# Patient Record
Sex: MALE | Race: BLACK OR AFRICAN AMERICAN | Employment: OTHER | URBAN - METROPOLITAN AREA
[De-identification: names, ages, dates, MRNs, and addresses within clinical notes are randomized per-mention and may not be internally consistent; named-entity substitution may affect disease eponyms.]

---

## 2020-06-29 ENCOUNTER — OFFICE VISIT (OUTPATIENT)
Dept: PRIMARY CARE CLINIC | Age: 75
End: 2020-06-29

## 2020-06-29 NOTE — PROGRESS NOTES
Justo Rodriguez received a viral test for COVID-19. They were educated on isolation and quarantine as appropriate. For any symptoms, they were directed to seek care from their PCP, given contact information to establish with a doctor, directed to an urgent care or the emergency room.

## 2020-06-30 LAB
SARS-COV-2: NOT DETECTED
SOURCE: NORMAL

## 2020-12-17 ENCOUNTER — OFFICE VISIT (OUTPATIENT)
Dept: SLEEP MEDICINE | Age: 75
End: 2020-12-17
Payer: MEDICARE

## 2020-12-17 VITALS
WEIGHT: 175 LBS | SYSTOLIC BLOOD PRESSURE: 134 MMHG | TEMPERATURE: 97.9 F | HEART RATE: 100 BPM | DIASTOLIC BLOOD PRESSURE: 78 MMHG

## 2020-12-17 PROCEDURE — 99204 OFFICE O/P NEW MOD 45 MIN: CPT | Performed by: PSYCHIATRY & NEUROLOGY

## 2020-12-17 RX ORDER — DOXEPIN HYDROCHLORIDE 10 MG/1
10 CAPSULE ORAL NIGHTLY
Qty: 30 CAPSULE | Refills: 3 | Status: SHIPPED | OUTPATIENT
Start: 2020-12-17 | End: 2021-04-14

## 2020-12-17 RX ORDER — TRAZODONE HYDROCHLORIDE 50 MG/1
TABLET ORAL
COMMUNITY
Start: 2020-12-15 | End: 2020-12-17 | Stop reason: CLARIF

## 2020-12-17 RX ORDER — PANTOPRAZOLE SODIUM 40 MG/1
40 TABLET, DELAYED RELEASE ORAL
COMMUNITY
Start: 2020-09-01

## 2020-12-17 RX ORDER — ATORVASTATIN CALCIUM 10 MG/1
TABLET, FILM COATED ORAL
COMMUNITY
Start: 2020-10-05

## 2020-12-17 RX ORDER — NAPROXEN 375 MG/1
TABLET ORAL
COMMUNITY
Start: 2020-11-13

## 2020-12-17 SDOH — HEALTH STABILITY: MENTAL HEALTH: HOW MANY STANDARD DRINKS CONTAINING ALCOHOL DO YOU HAVE ON A TYPICAL DAY?: NOT ASKED

## 2020-12-17 SDOH — HEALTH STABILITY: MENTAL HEALTH: HOW OFTEN DO YOU HAVE A DRINK CONTAINING ALCOHOL?: NOT ASKED

## 2020-12-17 ASSESSMENT — SLEEP AND FATIGUE QUESTIONNAIRES
HOW LIKELY ARE YOU TO NOD OFF OR FALL ASLEEP WHILE LYING DOWN TO REST IN THE AFTERNOON WHEN CIRCUMSTANCES PERMIT: 0
HOW LIKELY ARE YOU TO NOD OFF OR FALL ASLEEP WHILE SITTING AND TALKING TO SOMEONE: 1
HOW LIKELY ARE YOU TO NOD OFF OR FALL ASLEEP WHILE SITTING INACTIVE IN A PUBLIC PLACE: 0
ESS TOTAL SCORE: 1
HOW LIKELY ARE YOU TO NOD OFF OR FALL ASLEEP WHILE WATCHING TV: 0
HOW LIKELY ARE YOU TO NOD OFF OR FALL ASLEEP WHILE SITTING QUIETLY AFTER LUNCH WITHOUT ALCOHOL: 0
HOW LIKELY ARE YOU TO NOD OFF OR FALL ASLEEP IN A CAR, WHILE STOPPED FOR A FEW MINUTES IN TRAFFIC: 0
HOW LIKELY ARE YOU TO NOD OFF OR FALL ASLEEP WHEN YOU ARE A PASSENGER IN A CAR FOR AN HOUR WITHOUT A BREAK: 0
HOW LIKELY ARE YOU TO NOD OFF OR FALL ASLEEP WHILE SITTING AND READING: 0
NECK CIRCUMFERENCE (INCHES): 15

## 2020-12-17 ASSESSMENT — ENCOUNTER SYMPTOMS
COUGH: 0
GASTROINTESTINAL NEGATIVE: 1
EYES NEGATIVE: 1
ALLERGIC/IMMUNOLOGIC NEGATIVE: 1
APNEA: 0

## 2020-12-17 NOTE — PROGRESS NOTES
MD CHELY Rodriguez Board Certified in Sleep Medicine  Certified [facility] Sleep Medicine  Board Certified in Neurology [address]  [address] [facility] [address],  Hermes Carballo [address]  326 [facility] ([address] [facility]  [address], [address]           [address]  382 [facility] [address] [phone]    Subjective:     Patient ID: Jada Godoy is a 67 y.o. male. Chief Complaint   Patient presents with    New Patient     Np, hx of snoring       HPI:        Jada Godoy is a 67 y.o. male referred by No ref. provider found for a sleep evaluation. He complains of snoring, tossing and turning, difficulty falling asleep once awakened, insomnia but he denies snorting, choking, periods of not breathing, knees buckling with laughing, completely or partially paralyzed while falling asleep or waking up, noisy environment, uncomfortable room temperature, uncomfortable bedding. Symptoms began a few years ago, unchanged since that time. But the insomnia for 2-3 weeks only. Was diagnosed with H pylori 2-3 weeks ago. SLEEP SCHEDULE: Goes to bed around 12-1 AM in the weekdays and 12-1 PM in the weekends. It usually takes the patient 120-180 minutes to fall asleep. The patient gets up 2 per night to go to the bathroom. The Patient finally gets up at 9 AM during the weekdays and 9 AM in the weekends. patient wakes up with dry mouth. The patient has restless sleep with frequent arousals in addition to the Patient has significant daytime sleepiness. The Patient scored Total score: 1 on [place] Sleepiness Scale ( more than 10 is indicative of daytime sleepiness)and 26 in fatigue scale ( more than 36 is indicative of daytime fatigue). The patient takes few naps/week for 45 minutes and usually is not refreshing nap.      Previous evaluation and treatment has included- none.    Insomnia with sleep initiation and maintaining, usually takes the patient  hours to fall in sleep, wakes up 1-3 times from few to 60 minutes eachtime to fall back in sleep, usually stays in bed trying to fall in sleep, this might happened 7 nights a week, started 2 weeks ago, melatonin 3 mg did not work, sleep aid slept 2 hours, started naps during the day 45 minutes. Always sleeps only 5 hours ( short sleeper). Between 3-5 PM becomes sleepy. DOT/CDL - N/A  MIKEL/'[place]urah - N/A      Previous Report(s) Reviewed: historical medical records       Social History     Socioeconomic History    Marital status:      Spouse name: Not on file    Number of children: Not on file    Years of education: Not on file    Highest education level: Not on file   Occupational History    Not on file   Social Needs    Financial resource strain: Not on file    Food insecurity     Worry: Not on file     Inability: Not on file    Transportation needs     Medical: Not on file     Non-medical: Not on file   Tobacco Use    Smoking status: Former Smoker     Quit date:      Years since quittin.9    Smokeless tobacco: Never Used   Substance and Sexual Activity    Alcohol use:  Yes     Alcohol/week: 2.0 standard drinks     Types: 1 Glasses of wine, 1 Cans of beer per week    Drug use: Never    Sexual activity: Not on file   Lifestyle    Physical activity     Days per week: Not on file     Minutes per session: Not on file    Stress: Not on file   Relationships    Social connections     Talks on phone: Not on file     Gets together: Not on file     Attends Sabianism service: Not on file     Active member of club or organization: Not on file     Attends meetings of clubs or organizations: Not on file     Relationship status: Not on file    Intimate partner violence     Fear of current or ex partner: Not on file     Emotionally abused: Not on file     Physically abused: Not on file     Forced sexual activity: Not on file   Other Topics Concern    Not on file   Social History Narrative    Not on file       Prior to Admission medications    Medication Sig Start Date End Date Taking? Authorizing Provider   atorvastatin (LIPITOR) 10 MG tablet TAKE ONE TABLET BY MOUTH DAILY 10/5/20  Yes Historical Provider, MD   naproxen (NAPROSYN) 375 MG tablet TAKE ONE TABLET BY MOUTH TWICE A DAY AS NEEDED FOR GOUT FLARE 11/13/20  Yes Historical Provider, MD   pantoprazole (PROTONIX) 40 MG tablet Take 40 mg by mouth every morning (before breakfast) 9/1/20  Yes Historical Provider, MD   doxepin (SINEQUAN) 10 MG capsule Take 1 capsule by mouth nightly 12/17/20  Yes Rajni Valentino MD       Allergies as of 12/17/2020    (No Known Allergies)       Patient Active Problem List   Diagnosis    Gout    Irritable bowel syndrome       No past medical history on file. No past surgical history on file. No family history on file. Review of Systems   Constitutional: Positive for diaphoresis and fatigue. HENT: Positive for congestion. Eyes: Negative. Respiratory: Negative for apnea and cough. Cardiovascular: Negative. Negative for leg swelling. Gastrointestinal: Negative. Endocrine: Positive for cold intolerance and heat intolerance. Genitourinary: Positive for frequency. Musculoskeletal: Positive for arthralgias and myalgias. Skin: Negative. Allergic/Immunologic: Negative. Neurological: Negative for headaches. Hematological: Negative. Psychiatric/Behavioral: The patient is nervous/anxious. Objective:     Vitals:  Weight BMI Neck circumference    Wt Readings from Last 3 Encounters:   12/17/20 175 lb (79.4 kg)    There is no height or weight on file to calculate BMI.  Neck circumference: 15     BP HR SaO2   BP Readings from Last 3 Encounters:   12/17/20 134/78    Pulse Readings from Last 3 Encounters:   12/17/20 100    SpO2 Readings from Last 3 Encounters:   No data found for SpO2        The mandibular molar Class :   [x]1 []2 []3      Mallampati I Airway Classification:   []1 []2 [x]3 []4        Physical Exam  Vitals signs and nursing note reviewed. Constitutional:       Appearance: Normal appearance. HENT:      Head: Atraumatic. Nose: Nose normal.      Mouth/Throat:      Comments: Mallampati class 3, no retrognathia or hypognathia , normal airflow in bilateral nostrils, no septum deviation , crowded oropharynx with low soft palate, high arched hard palate,no tonsils enlargement. Eyes:      Extraocular Movements: Extraocular movements intact. Neck:      Musculoskeletal: Normal range of motion and neck supple. Cardiovascular:      Rate and Rhythm: Normal rate and regular rhythm. Heart sounds: Normal heart sounds. Pulmonary:      Effort: Pulmonary effort is normal.      Breath sounds: Normal breath sounds. Musculoskeletal: Normal range of motion. Skin:     General: Skin is warm. Neurological:      General: No focal deficit present. Psychiatric:         Mood and Affect: Mood normal.         Assessment:   Acute insomnia for 2 weeks  Short sleeper  Suspected GUZMAN with snoring and insomnia. Diagnosis Orders   1. Obstructive sleep apnea  Home Sleep Study   2. Acute insomnia  Home Sleep Study    doxepin (SINEQUAN) 10 MG capsule     Plan:     Sleep compression between 12 AM till 6:30 AM  No naps  Take the medicine at 11 PM.  Patient was counseled about the pathophysiology of obstructive sleep apnea syndrome and the methods for evaluating its presence and severity. Patient was counseled to avoid driving and other potentially hazardous circumstances if the patient is experiencing excessive sleepiness.   Treatment considerations include the use of nasal CPAP, oral dental appliance or a surgical intervention, which should be based on otolarygologic findings, In the meantime, the patient should be cautioned to avoid the use of alcohol or other depressant medications because of potential for increasing the duration and severity of apnea and cautioned regarding driving or operating and dangerous equipment if the patient is experiencing daytime sleepiness. .        Orders Placed This Encounter   Procedures    Home Sleep Study       Return in about 4 weeks (around 1/14/2021) for insomnia.     Los Spicer MD  Medical Director - [facility]

## 2020-12-17 NOTE — PATIENT INSTRUCTIONS
Sleep compression between 12 AM till 6:30 AM  No naps  Take the medicine at 11 PM.  Patient Education        Insomnia: Care Instructions  Your Care Instructions     Insomnia is the inability to sleep well. It is a common problem for most people at some time. Insomnia may make it hard for you to get to sleep, stay asleep, or sleep as long as you need to. This can make you tired and grouchy during the day. It can also make you forgetful, less effective at work, and unhappy. Insomnia can be caused by conditions such as depression or anxiety. Pain can also affect your ability to sleep. When these problems are solved, the insomnia usually clears up. But sometimes bad sleep habits can cause insomnia. If insomnia is affecting your work or your enjoyment of life, you can take steps to improve your sleep. Follow-up care is a key part of your treatment and safety. Be sure to make and go to all appointments, and call your doctor if you are having problems. It's also a good idea to know your test results and keep a list of the medicines you take. How can you care for yourself at home? What to avoid   · Do not have drinks with caffeine, such as coffee or black tea, for 8 hours before bed. · Do not smoke or use other types of tobacco near bedtime. Nicotine is a stimulant and can keep you awake. · Avoid drinking alcohol late in the evening, because it can cause you to wake in the middle of the night. · Do not eat a big meal close to bedtime. If you are hungry, eat a light snack. · Do not drink a lot of water close to bedtime, because the need to urinate may wake you up during the night. · Do not read or watch TV in bed. Use the bed only for sleeping and sexual activity. What to try   · Go to bed at the same time every night, and wake up at the same time every morning. Do not take naps during the day. · Keep your bedroom quiet, dark, and cool. · Sleep on a comfortable pillow and mattress.   · If watching the clock makes you anxious, turn it facing away from you so you cannot see the time. · If you worry when you lie down, start a worry book. Well before bedtime, write down your worries, and then set the book and your concerns aside. · Try meditation or other relaxation techniques before you go to bed. · If you cannot fall asleep, get up and go to another room until you feel sleepy. Do something relaxing. Repeat your bedtime routine before you go to bed again. · Make your house quiet and calm about an hour before bedtime. Turn down the lights, turn off the TV, log off the computer, and turn down the volume on music. This can help you relax after a busy day. When should you call for help? Watch closely for changes in your health, and be sure to contact your doctor if:    · Your efforts to improve your sleep do not work.     · Your insomnia gets worse.     · You have been feeling down, depressed, or hopeless or have lost interest in things that you usually enjoy. Where can you learn more? Go to https://[employer] Art.SenseData. org and sign in to your [employer] account. Enter P513 in the OnAir3G box to learn more about \"Insomnia: Care Instructions. \"     If you do not have an account, please click on the \"Sign Up Now\" link. Current as of: December 16, 2019               Content Version: 12.6  © 4766-3205 [employer]. Care instructions adapted under license by [facility] ([facility]). If you have questions about a medical condition or this instruction, always ask your healthcare professional. [place] [address] any warranty or liability for your use of this information.

## 2020-12-21 RX ORDER — DOCUSATE SODIUM 100 MG/1
CAPSULE, LIQUID FILLED ORAL
Qty: 90 CAPSULE | Refills: 3 | Status: SHIPPED | OUTPATIENT
Start: 2020-12-21

## 2020-12-21 NOTE — TELEPHONE ENCOUNTER
Patient states he is experiencing constipation from the Doxepin. He refuses to try a fleet enema. He wants to know what he can take for his constipation? He states he will have to stop taking the medication if this is a common side effect. Can he take anything else? Please advise.

## 2020-12-21 NOTE — TELEPHONE ENCOUNTER
Tennille Valentino called back to inquire as to whether he should continue the doxepin.   I spoke with Dr Jerrica Hagan in the office and he said for Tennille Valentino to take both the doxepin and the colace

## 2020-12-30 ENCOUNTER — HOSPITAL ENCOUNTER (OUTPATIENT)
Dept: SLEEP CENTER | Age: 75
Discharge: HOME | End: 2020-12-30
Payer: MEDICARE

## 2020-12-30 PROCEDURE — 95806 SLEEP STUDY UNATT&RESP EFFT: CPT

## 2021-01-04 PROCEDURE — 95806 SLEEP STUDY UNATT&RESP EFFT: CPT | Performed by: PSYCHIATRY & NEUROLOGY

## 2021-01-04 NOTE — PROGRESS NOTES
Jv Drilling         : 1945  [] [address]     [] [address]      [] Camille     [][employer]    [] [employer]  [] [facility]   [] Other:  Diagnosis: [x] GUZMAN (G47.33) [] CSA (G47.31) [] Apnea (G47.30)   Length of Need: [] 12 Months [x] 99 Months [] Other:    Machine (GUSTAVO!): [x] Respironics Dream Station      Auto [x] ResMed AirSense     Auto [] Other:     [x]  CPAP () [] Bilevel ()   Mode: [x] Auto [] Spontaneous    Mode: [] Auto [] Spontaneous           Between 5 and 15 cm                 Comfort Settings:   - Ramp Pressure: 5 cmH2O                                        - Ramp time: 15 min                                     -  Flex/EPR - 3 full time                                    - For ResMed Bilevel (TiMax-4 sec   TiMin- 0.2 sec)     Humidifier: [x] Heated ()        [x] Water chamber replacement ()/ 1 per 6 months        Mask:   [x] Nasal () /1 per 3 months [x] Full Face () /1 per 3 months   [x] Patient choice -Size and fit mask [x] Patient Choice - Size and fit mask   [] Dispense:  [] Dispense:    [x] Headgear () / 1 per 3 months [x] Headgear () / 1 per 3 months   [x] Replacement Nasal Cushion ()/2 per month [x] Interface Replacement ()/1 per month   [x] Replacement Nasal Pillows ()/2 per month         Tubing: [x] Heated ()/1 per 3 months    [] Standard ()/1 per 3 months [] Other:           Filters: [x] Non-disposable ()/1 per 6 months     [x] Ultra-Fine, Disposable ()/2 per month        Miscellaneous: [x] Chin Strap ()/ 1 per 6 months [] O2 bleed-in:       LPM   [] Oximetry on CPAP/Bilevel []  Other:          Start Order Date: 21    MEDICAL JUSTIFICATION:  I, the undersigned, certify that the above prescribed supplies are medically necessary for this patients wellbeing.   In my opinion, the supplies are both reasonable and necessary in reference to accepted standards of medicalpractice in treatment of this patients condition.     Katiuska Arguello MD      NPI: [id number]       Order Signed Date: 01/04/21    Electronically signed by Katiuska Arguello MD on 1/4/2021 at 10:54 AM home

## 2021-01-05 ENCOUNTER — TELEPHONE (OUTPATIENT)
Dept: PULMONOLOGY | Age: 76
End: 2021-01-05

## 2021-01-19 ENCOUNTER — OFFICE VISIT (OUTPATIENT)
Dept: SLEEP MEDICINE | Age: 76
End: 2021-01-19
Payer: MEDICARE

## 2021-01-19 VITALS
TEMPERATURE: 96.9 F | HEART RATE: 89 BPM | HEIGHT: 69 IN | RESPIRATION RATE: 16 BRPM | SYSTOLIC BLOOD PRESSURE: 131 MMHG | BODY MASS INDEX: 26.42 KG/M2 | OXYGEN SATURATION: 99 % | WEIGHT: 178.4 LBS | DIASTOLIC BLOOD PRESSURE: 76 MMHG

## 2021-01-19 DIAGNOSIS — G47.33 OBSTRUCTIVE SLEEP APNEA: ICD-10-CM

## 2021-01-19 DIAGNOSIS — G47.00 ACUTE INSOMNIA: Primary | ICD-10-CM

## 2021-01-19 PROCEDURE — 99213 OFFICE O/P EST LOW 20 MIN: CPT | Performed by: PSYCHIATRY & NEUROLOGY

## 2021-01-19 ASSESSMENT — SLEEP AND FATIGUE QUESTIONNAIRES
HOW LIKELY ARE YOU TO NOD OFF OR FALL ASLEEP WHILE SITTING AND READING: 1
HOW LIKELY ARE YOU TO NOD OFF OR FALL ASLEEP WHILE LYING DOWN TO REST IN THE AFTERNOON WHEN CIRCUMSTANCES PERMIT: 1
HOW LIKELY ARE YOU TO NOD OFF OR FALL ASLEEP WHEN YOU ARE A PASSENGER IN A CAR FOR AN HOUR WITHOUT A BREAK: 0
HOW LIKELY ARE YOU TO NOD OFF OR FALL ASLEEP IN A CAR, WHILE STOPPED FOR A FEW MINUTES IN TRAFFIC: 0
HOW LIKELY ARE YOU TO NOD OFF OR FALL ASLEEP WHILE SITTING AND TALKING TO SOMEONE: 0
ESS TOTAL SCORE: 2

## 2021-01-19 ASSESSMENT — ENCOUNTER SYMPTOMS: APNEA: 0

## 2021-01-19 NOTE — PROGRESS NOTES
Elora Eisenmenger, MD ABIM Board Certified in Sleep Medicine  Certified in [address] [place] [address] Certified in Neurology [address]  [address], [address]  [phone]   [address] [place] [address], [address] [place] [address] Ne                      [address]  [address] [place] [address] [phone]    Subjective:     Patient ID: Alvaro Calloway is a 67 y.o. male. Chief Complaint   Patient presents with    Follow-up     GUZMAN       HPI:        Alvaro Calloway is a 67 y.o. male was seen today as a follow for obstructive sleep apnea. The patient underwent home sleep testing on 2020, the overnight registration revealed mild obstructive sleep apnea with apnea hypopnea index of 11.6/hr with lowest O2 saturation of 83%, patient spent about 17.5 minutes below 90%. Was started on Doxepin 10 mg last visit. He does not want to try the CPAP.      DOT/CDL - N/A        Previous Report(s)Reviewed: historical medical records         Social History     Socioeconomic History    Marital status:      Spouse name: Not on file    Number of children: Not on file    Years of education: Not on file    Highest education level: Not on file   Occupational History    Not on file   Social Needs    Financial resource strain: Not on file    Food insecurity     Worry: Not on file     Inability: Not on file    Transportation needs     Medical: Not on file     Non-medical: Not on file   Tobacco Use    Smoking status: Former Smoker     Quit date:      Years since quittin.0    Smokeless tobacco: Never Used   Substance and Sexual Activity    Alcohol use:  Yes     Alcohol/week: 2.0 standard drinks     Types: 1 Glasses of wine, 1 Cans of beer per week    Drug use: Never    Sexual activity: Not on file   Lifestyle    Physical activity Days per week: Not on file     Minutes per session: Not on file    Stress: Not on file   Relationships    Social connections     Talks on phone: Not on file     Gets together: Not on file     Attends Denominational service: Not on file     Active member of club or organization: Not on file     Attends meetings of clubs or organizations: Not on file     Relationship status: Not on file    Intimate partner violence     Fear of current or ex partner: Not on file     Emotionally abused: Not on file     Physically abused: Not on file     Forced sexual activity: Not on file   Other Topics Concern    Not on file   Social History Narrative    Not on file       Prior to Admission medications    Medication Sig Start Date End Date Taking? Authorizing Provider   docusate sodium (COLACE) 100 MG capsule One cap QD 12/21/20  Yes Milagros Iniguez MD   atorvastatin (LIPITOR) 10 MG tablet TAKE ONE TABLET BY MOUTH DAILY 10/5/20  Yes Historical Provider, MD   naproxen (NAPROSYN) 375 MG tablet TAKE ONE TABLET BY MOUTH TWICE A DAY AS NEEDED FOR GOUT FLARE 11/13/20  Yes Historical Provider, MD   pantoprazole (PROTONIX) 40 MG tablet Take 40 mg by mouth every morning (before breakfast) 9/1/20  Yes Historical Provider, MD   doxepin (SINEQUAN) 10 MG capsule Take 1 capsule by mouth nightly 12/17/20  Yes Milagros Iniguez MD       Allergies as of 01/19/2021    (No Known Allergies)       Patient Active Problem List   Diagnosis    Gout    Irritable bowel syndrome    Obstructive sleep apnea       No past medical history on file. No past surgical history on file. No family history on file. Review of Systems   Constitutional: Negative for fatigue. Respiratory: Negative for apnea. Genitourinary: Negative for frequency. Neurological: Negative for headaches.        Objective:     Vitals:  Weight BMI Neck circumference    Wt Readings from Last 3 Encounters:   01/19/21 178 lb 6.4 oz (80.9 kg)   12/17/20 175 lb (79.4 kg)

## 2021-01-19 NOTE — PATIENT INSTRUCTIONS

## 2021-04-13 DIAGNOSIS — G47.00 ACUTE INSOMNIA: ICD-10-CM

## 2021-04-14 RX ORDER — DOXEPIN HYDROCHLORIDE 10 MG/1
10 CAPSULE ORAL NIGHTLY
Qty: 30 CAPSULE | Refills: 2 | Status: SHIPPED | OUTPATIENT
Start: 2021-04-14 | End: 2021-07-12

## 2021-07-10 DIAGNOSIS — G47.00 ACUTE INSOMNIA: ICD-10-CM

## 2021-07-12 RX ORDER — DOXEPIN HYDROCHLORIDE 10 MG/1
10 CAPSULE ORAL NIGHTLY
Qty: 30 CAPSULE | Refills: 1 | Status: SHIPPED | OUTPATIENT
Start: 2021-07-12 | End: 2021-07-19 | Stop reason: SDUPTHER

## 2021-07-19 ENCOUNTER — OFFICE VISIT (OUTPATIENT)
Dept: SLEEP MEDICINE | Age: 76
End: 2021-07-19
Payer: MEDICARE

## 2021-07-19 VITALS
OXYGEN SATURATION: 97 % | TEMPERATURE: 97.8 F | HEIGHT: 69 IN | WEIGHT: 179.2 LBS | RESPIRATION RATE: 16 BRPM | HEART RATE: 76 BPM | SYSTOLIC BLOOD PRESSURE: 120 MMHG | BODY MASS INDEX: 26.54 KG/M2 | DIASTOLIC BLOOD PRESSURE: 70 MMHG

## 2021-07-19 DIAGNOSIS — F51.04 CHRONIC INSOMNIA: ICD-10-CM

## 2021-07-19 DIAGNOSIS — G47.33 MILD OBSTRUCTIVE SLEEP APNEA: Primary | ICD-10-CM

## 2021-07-19 PROCEDURE — 99214 OFFICE O/P EST MOD 30 MIN: CPT | Performed by: PSYCHIATRY & NEUROLOGY

## 2021-07-19 RX ORDER — DOXEPIN HYDROCHLORIDE 10 MG/1
10 CAPSULE ORAL NIGHTLY
Qty: 30 CAPSULE | Refills: 1 | Status: SHIPPED | OUTPATIENT
Start: 2021-07-19 | End: 2021-11-18

## 2021-07-19 ASSESSMENT — SLEEP AND FATIGUE QUESTIONNAIRES
HOW LIKELY ARE YOU TO NOD OFF OR FALL ASLEEP WHILE SITTING INACTIVE IN A PUBLIC PLACE: 0
ESS TOTAL SCORE: 2
HOW LIKELY ARE YOU TO NOD OFF OR FALL ASLEEP WHILE SITTING QUIETLY AFTER LUNCH WITHOUT ALCOHOL: 0
HOW LIKELY ARE YOU TO NOD OFF OR FALL ASLEEP IN A CAR, WHILE STOPPED FOR A FEW MINUTES IN TRAFFIC: 0
HOW LIKELY ARE YOU TO NOD OFF OR FALL ASLEEP WHILE SITTING AND READING: 0
HOW LIKELY ARE YOU TO NOD OFF OR FALL ASLEEP WHILE LYING DOWN TO REST IN THE AFTERNOON WHEN CIRCUMSTANCES PERMIT: 1
HOW LIKELY ARE YOU TO NOD OFF OR FALL ASLEEP WHEN YOU ARE A PASSENGER IN A CAR FOR AN HOUR WITHOUT A BREAK: 0
HOW LIKELY ARE YOU TO NOD OFF OR FALL ASLEEP WHILE SITTING AND TALKING TO SOMEONE: 0
HOW LIKELY ARE YOU TO NOD OFF OR FALL ASLEEP WHILE WATCHING TV: 1

## 2021-07-19 NOTE — PATIENT INSTRUCTIONS
worry when you lie down, start a worry book. Well before bedtime, write down your worries, and then set the book and your concerns aside. · Try meditation or other relaxation techniques before you go to bed. · If you cannot fall asleep, get up and go to another room until you feel sleepy. Do something relaxing. Repeat your bedtime routine before you go to bed again. · Make your house quiet and calm about an hour before bedtime. Turn down the lights, turn off the TV, log off the computer, and turn down the volume on music. This can help you relax after a busy day. When should you call for help? Watch closely for changes in your health, and be sure to contact your doctor if:    · Your efforts to improve your sleep do not work.     · Your insomnia gets worse.     · You have been feeling down, depressed, or hopeless or have lost interest in things that you usually enjoy. Where can you learn more? Go to https://[employer] Nanotechnology[employer]Hemp [employer].Personify Inc. org and sign in to your [employer] account. Enter P513 in the Digital Signal box to learn more about \"Insomnia: Care Instructions. \"     If you do not have an account, please click on the \"Sign Up Now\" link. Current as of: August 31, 2020               Content Version: 12.9  © 2006-2021 Healthwise, Incorporated. Care instructions adapted under license by [facility] ([facility]). If you have questions about a medical condition or this instruction, always ask your healthcare professional. [place] [address] any warranty or liability for your use of this information.

## 2021-07-19 NOTE — PROGRESS NOTES
MD CHELY Galan Board Certified in Sleep Medicine  Certified in [address] [place] [address] Certified in Neurology [address]  1000 [facility] [address] [address],  [address]  [phone] 794 [facility], [address] [place] [address] Ave [address]                      [address]  [address] [place] [address] [phone]    Subjective:     Patient ID: Giovanna Bateman is a 67 y.o. male. Chief Complaint   Patient presents with    Follow-up     7 month f/u       HPI:        Giovanna Bateman is a 67 y.o. male was seen today as a follow for mild obstructive sleep apnea with apnea hypopnea index of 11.6/hr with lowest O2 saturation of 83%, patient spent about 17.5 minutes below 90%(not on CPAP) and insomnia on Doxepin 10 mg. [place] gained weight in the last 6 months. Still takes Doxepin 10 mg , his brain does not shut down at night, takes it at 11 PM and fall in sleep at 12 Midnight. DOT/CDL - N/A        Previous Report(s)Reviewed: historical medical records         Social History     Socioeconomic History    Marital status:      Spouse name: Not on file    Number of children: Not on file    Years of education: Not on file    Highest education level: Not on file   Occupational History    Not on file   Tobacco Use    Smoking status: Former Smoker     Quit date:      Years since quittin.5    Smokeless tobacco: Never Used   Vaping Use    Vaping Use: Never used   Substance and Sexual Activity    Alcohol use:  Yes     Alcohol/week: 2.0 standard drinks     Types: 1 Glasses of wine, 1 Cans of beer per week    Drug use: Never    Sexual activity: Not on file   Other Topics Concern    Not on file   Social History Narrative    Not on file     Social Determinants of Health     Financial Resource Strain:     Difficulty of Paying Living Expenses:    Food Insecurity:     Worried About Running Out of Food in the Last Year:     [address] in the Last Year:    Transportation Needs:     Lack of Transportation (Medical):  Lack of Transportation (Non-Medical):    Physical Activity:     Days of Exercise per Week:     Minutes of Exercise per Session:    Stress:     Feeling of Stress :    Social Connections:     Frequency of Communication with Friends and Family:     Frequency of Social Gatherings with Friends and Family:     Attends Christian Services:     Active Member of Clubs or Organizations:     Attends Club or Organization Meetings:     Marital Status:    Intimate Partner Violence:     Fear of Current or Ex-Partner:     Emotionally Abused:     Physically Abused:     Sexually Abused:        Prior to Admission medications    Medication Sig Start Date End Date Taking? Authorizing Provider   doxepin (SINEQUAN) 10 MG capsule Take 1 capsule by mouth nightly 7/19/21  Yes Sung Tilley MD   docusate sodium (COLACE) 100 MG capsule One cap QD 12/21/20  Yes Sung Tilley MD   atorvastatin (LIPITOR) 10 MG tablet TAKE ONE TABLET BY MOUTH DAILY 10/5/20  Yes Historical Provider, MD   naproxen (NAPROSYN) 375 MG tablet TAKE ONE TABLET BY MOUTH TWICE A DAY AS NEEDED FOR GOUT FLARE 11/13/20  Yes Historical Provider, MD   pantoprazole (PROTONIX) 40 MG tablet Take 40 mg by mouth every morning (before breakfast) 9/1/20  Yes Historical Provider, MD       Allergies as of 07/19/2021    (No Known Allergies)       Patient Active Problem List   Diagnosis    Gout    Irritable bowel syndrome    Obstructive sleep apnea       History reviewed. No pertinent past medical history. History reviewed. No pertinent surgical history. History reviewed. No pertinent family history. Review of Systems   Psychiatric/Behavioral: Positive for sleep disturbance.        Objective:     Vitals:  Weight BMI Neck circumference    Wt Readings from Last 3 Encounters:   07/19/21 179

## 2021-11-18 DIAGNOSIS — F51.04 CHRONIC INSOMNIA: ICD-10-CM

## 2021-11-18 RX ORDER — DOXEPIN HYDROCHLORIDE 10 MG/1
10 CAPSULE ORAL NIGHTLY
Qty: 30 CAPSULE | Refills: 1 | Status: SHIPPED | OUTPATIENT
Start: 2021-11-18

## 2024-01-10 ENCOUNTER — OFFICE VISIT (OUTPATIENT)
Dept: ORTHOPEDIC SURGERY | Age: 79
End: 2024-01-10

## 2024-01-10 VITALS — HEIGHT: 69 IN | WEIGHT: 188 LBS | BODY MASS INDEX: 27.85 KG/M2

## 2024-01-10 DIAGNOSIS — M25.561 CHRONIC PAIN OF RIGHT KNEE: Primary | ICD-10-CM

## 2024-01-10 DIAGNOSIS — M17.11 ARTHRITIS OF RIGHT KNEE: ICD-10-CM

## 2024-01-10 DIAGNOSIS — G89.29 CHRONIC PAIN OF RIGHT KNEE: Primary | ICD-10-CM

## 2024-01-10 RX ORDER — BUPIVACAINE HYDROCHLORIDE 2.5 MG/ML
2 INJECTION, SOLUTION INFILTRATION; PERINEURAL ONCE
Status: COMPLETED | OUTPATIENT
Start: 2024-01-10 | End: 2024-01-10

## 2024-01-10 RX ORDER — TRIAMCINOLONE ACETONIDE 40 MG/ML
40 INJECTION, SUSPENSION INTRA-ARTICULAR; INTRAMUSCULAR ONCE
Status: COMPLETED | OUTPATIENT
Start: 2024-01-10 | End: 2024-01-10

## 2024-01-10 RX ADMIN — BUPIVACAINE HYDROCHLORIDE 5 MG: 2.5 INJECTION, SOLUTION INFILTRATION; PERINEURAL at 13:53

## 2024-01-10 RX ADMIN — TRIAMCINOLONE ACETONIDE 40 MG: 40 INJECTION, SUSPENSION INTRA-ARTICULAR; INTRAMUSCULAR at 13:53

## 2024-01-12 PROBLEM — M17.11 ARTHRITIS OF RIGHT KNEE: Status: ACTIVE | Noted: 2024-01-12

## 2024-01-12 NOTE — PROGRESS NOTES
Subjective:      Patient ID: Gary HUANG is a 78 y.o.  male who presents with a new complaint of  right knee pain.  Onset of symptoms years.   These symptoms have been progressive in nature.   History of injury- no.   History of prior right knee surgery- no.  Pain is 7/10 VAS.   Location of pain- medial right knee.   Pain is worse with weight bearing activity.   Pain improves with rest and elevation.   Length of ambulation is typically affected by the knee pain.    Previous treatments have included-Tylenol, occasional anti-inflammatory medications.   Family history for osteoarthritis-positive.    Review of Systems:  I have reviewed the clinically relevant past medical history, medications, allergies, family history, social history, and 13 point Review of Systems from the patient's recent history form & documented any details relevant to today's presenting complaints in the history above. The patient's self-reported past medical history, medications, allergies, family history, social history, and Review of Systems form from today's date have been scanned into the chart under the \"Media\" tab.        History reviewed. No pertinent past medical history.    History reviewed. No pertinent family history.    History reviewed. No pertinent surgical history.    Social History     Occupational History    Not on file   Tobacco Use    Smoking status: Former     Current packs/day: 0.00     Types: Cigarettes     Quit date:      Years since quittin.0    Smokeless tobacco: Never   Vaping Use    Vaping Use: Never used   Substance and Sexual Activity    Alcohol use: Yes     Alcohol/week: 2.0 standard drinks of alcohol     Types: 1 Glasses of wine, 1 Cans of beer per week    Drug use: Never    Sexual activity: Not on file       Current Outpatient Medications   Medication Sig Dispense Refill    doxepin (SINEQUAN) 10 MG capsule Take 1 capsule by mouth nightly 30 capsule 1    docusate sodium (COLACE) 100 MG capsule One cap QD